# Patient Record
Sex: FEMALE | Race: WHITE
[De-identification: names, ages, dates, MRNs, and addresses within clinical notes are randomized per-mention and may not be internally consistent; named-entity substitution may affect disease eponyms.]

---

## 2017-04-18 ENCOUNTER — HOSPITAL ENCOUNTER (EMERGENCY)
Dept: HOSPITAL 75 - ER | Age: 22
Discharge: HOME | End: 2017-04-18
Payer: SELF-PAY

## 2017-04-18 VITALS — DIASTOLIC BLOOD PRESSURE: 93 MMHG | SYSTOLIC BLOOD PRESSURE: 135 MMHG

## 2017-04-18 VITALS — HEIGHT: 66 IN | WEIGHT: 135 LBS | BODY MASS INDEX: 21.69 KG/M2

## 2017-04-18 DIAGNOSIS — F19.21: ICD-10-CM

## 2017-04-18 DIAGNOSIS — F17.210: ICD-10-CM

## 2017-04-18 DIAGNOSIS — J45.901: Primary | ICD-10-CM

## 2017-04-18 PROCEDURE — 84703 CHORIONIC GONADOTROPIN ASSAY: CPT

## 2017-04-18 PROCEDURE — 87804 INFLUENZA ASSAY W/OPTIC: CPT

## 2017-04-18 PROCEDURE — 96372 THER/PROPH/DIAG INJ SC/IM: CPT

## 2017-04-18 PROCEDURE — 71020: CPT

## 2017-04-18 PROCEDURE — 99282 EMERGENCY DEPT VISIT SF MDM: CPT

## 2017-04-18 NOTE — DIAGNOSTIC IMAGING REPORT
Indication: Cough and wheezing



PA and lateral chest obtained at 932 hours a.m.



Heart and mediastinal silhouette are normal in appearance. The

lungs are clear. There is no pneumothorax or pleural fluid.



IMPRESSION:



Negative chest.



Dictated by:



Dictated on workstation # [id number]

## 2017-04-18 NOTE — ED RESPIRATORY
General


Chief Complaint:  Respiratory Problems


Stated Complaint:  COUGH/SOA


Nursing Triage Note:  


c/o difficulty breathing with cough. Significant wheezing auscultated. Pt 


reports having these episodes 2 times per week. Continues to smoke.


Source:  patient


Exam Limitations:  no limitations





History of Present Illness


Time seen by provider:  08:35


Initial Comments


This 21-year-old young lady presents to emergency room with difficulty breathing

, cough, and wheezing.  She is in mild respiratory distress on arrival.  She 

denies fever but is diaphoretic.  She has been seen and admitted for 

respiratory problems in the past.  She has never been formally diagnosed with 

asthma but has symptoms.  She never had her inhaler filled after being seen the 

last time because of cost.  She also has a history of methamphetamine use and 

smoking marijuana.  She denies use of illicit substances within the past few 

days.  She is presently seeking treatment at [facility] for substance abuse.  She 

continues to smoke despite her respiratory problems.  She has been using 

another family member's nebulizer machine at home but it broke last night.  She 

has not had a nebulizer treatment today.





Allergies and Home Medications


Allergies


Coded Allergies:  


     Penicillins (Unverified  Allergy, Mild, 12/7/10)


     ampicillin (Verified  Allergy, Mild, 13)


     levofloxacin (Verified  Allergy, Mild, RASH/ITCHING, 12/25/15)





Home Medications


Albuterol Sulfate 2.5 Mg/3 Ml Vial.neb, 2.5 MG IH Q4H PRN for WHEEZING, #30


   Prescribed by: PATSY MEDINA on 17 1006


Albuterol Sulfate 1 Puff Puff, 1-4 PUFF IH Q4H PRN for WHEEZING, #1


   1 PUFF = 90 MCG 


   Prescribed by: PATSY MEDINA on 17 1006


Budesonide 0.5 Mg/2 Ml Ampul.neb, 0.5 MG INH RTBID, #30


   Prescribed by: ABBIE DAMON on 12/26/15 1224


Cephalexin 500 Mg Capsule, 500 MG PO TID, #30


   Prescribed by: ABBIE DAMON on 12/26/15 1224


Ipratropium/Albuterol Sulfate 3 Ml Ampul.neb, 3 ML INH RTQ2H PRN for SOA, #30


   Prescribed by: ABBIE DAMON on 12/26/15 1224


L. Acidophilus/Bulgaricus 1 Each Tablet, 1 TAB.CHEW PO AC, #40


   Prescribed by: ABBIE DAMON on 12/26/15 1224


Prednisone 20 Mg Tab, 60 MG PO DAILY, #15


   Prescribed by: ABBIE DAMON on 12/26/15 1224


Prednisone 20 Mg Tab, 20 MG PO DAILY, #4


   Prescribed by: PATSY MEDINA on 17 1006





Constitutional:  no symptoms reported


EENTM:  no symptoms reported


Respiratory:  see HPI


Cardiovascular:  no symptoms reported


Gastrointestinal:  no symptoms reported


Genitourinary:  no symptoms reported


Musculoskeletal:  no symptoms reported


Skin:  no symptoms reported


Psychiatric/Neurological:  See HPI


Hematologic/Lymphatic:  No Symptoms Reported





Past Medical-Social-Family Hx


Patient Social History


Alcohol Use:  Denies Use


Recreational Drug Use:  No


Smoking Status:  Current Everyday Smoker


2nd Hand Smoke Exposure:  Yes


Recent Foreign Travel:  No


Contact w/Someone Who Travel:  No


Recent Infectious Disease Expo:  No


Recent Hopitalizations:  No





Immunizations Up To Date


Tetanus Booster (TDap):  Unknown


PED Vaccines UTD:  No





Seasonal Allergies


Seasonal Allergies:  No





Surgeries


HX Surgeries:  Yes (wisdom teeth)





Respiratory


Hx Respiratory Disorders:  Yes (tobaccoism, marijuana use, restrictive airway 

disease)


Respiratory Disorders:  Pneumonia





Cardiovascular


Hx Cardiac Disorders:  No





Neurological


Hx Neurological Disorders:  No





Reproductive System


Hx Reproductive Disorders:  No


Sexually Transmitted Disease:  No


HIV/AIDS:  No


Female Reproductive Disorders:  Denies





Genitourinary


Hx Genitourinary Disorders:  Yes


Genitourinary Disorders:  UTI-Chronic





Gastrointestinal


Hx Gastrointestinal Disorders:  No





Musculoskeletal


Hx Musculoskeletal Disorders:  No





Endocrine


Hx Endocrine Disorders:  No





HEENT


HX ENT Disorders:  No


Loss of Vision:  Denies


Hearing Impairment:  Denies





Cancer


Hx Cancer:  No





Psychosocial


Hx Psychiatric Problems:  Yes


Behavioral Health Disorders:  Anxiety





Integumentary


HX Skin/Integumentary Disorder:  No





Blood Transfusions


Hx Blood Disorders:  No


Adverse Reaction to a Blood Tr:  No





Family Medical History


Significant Family History:  Heart Disease, Hypertension


Family Medial History:  


Patient reports no known family medical history.





Physical Exam


Vital Signs





Vital Sign - Last 12Hours








 17





 08:30


 


Temp 97.9


 


Pulse 101


 


Resp 20


 


B/P (MAP) 138/108


 


Pulse Ox 90


 


O2 Delivery Room Air





Capillary Refill : Less Than 3 Seconds


General Appearance:  WD/WN, mild distress


HEENT:  PERRL/EOMI, normal ENT inspection


Neck:  normal inspection


Respiratory:  respiratory distress, accessory muscle use, wheezing


Cardiovascular:  regular rate, rhythm, no edema


Gastrointestinal:  non tender, soft


Extremities:  normal inspection, no pedal edema


Neurologic/Psychiatric:  CNs II-XII nml as tested, no motor/sensory deficits, 

alert, normal mood/affect, oriented x 3


Skin:  normal color, warm/dry





Progress/Results/Core Measures


Results/Orders


Micro Results





Microbiology


17 Influenza Types A,B Antigen (ANNY) - Final, Complete


          





My Orders





Orders - PATSY VELAZQUEZ MD


Albuterol/Ipra Inhalation Soln (Duoneb I (17 08:37)


Chest Pa/Lat (2 View) (17 08:41)


Albuterol/Ipra Inhalation Soln (Duoneb I (17 08:45)


Svn Sm Volume Nebulizer Rt-Rfs (17 08:41)


Urine Pregnancy Bedside (17 08:41)


Influenza A And B Antigens (17 08:41)


Methylprednisolone Sod Succ (Solu-Medrol (17 09:15)





Medications Given in ED





Current Medications








 Medications  Dose


 Ordered  Sig/Kate


 Route  Start Time


 Stop Time Status Last Admin


Dose Admin


 


 Albuterol/


 Ipratropium  3 ml  STK-MED ONCE


 .ROUTE  17 08:37


 17 08:41 DC 17 08:42


3 ML


 


 Methylprednisolone


 Sodium Succinate  125 mg  ONCE  ONCE


 IM  17 09:15


 17 09:16 DC 17 09:23


125 MG








Vital Signs/I&O





Vital Sign - Last 12Hours








 17





 08:30 09:23 10:12


 


Temp 97.9 97.9 97.5


 


Pulse 101  88


 


Resp 20  18


 


B/P (MAP) 138/108  


 


Pulse Ox 90  98


 


O2 Delivery Room Air  














Blood Pressure Mean:  118











Point of Care Testing


Urine Pregnancy-Bedside:  Negative


Progress Note :  


Progress Note


Patient received a DuoNeb treatment and a Solu-Medrol injection.  Wheezing 

resolved and she felt much better.  We discussed smoking cessation and 

substance abuse treatment at length.





Diagnostic Imaging





   Diagonstic Imaging:  Xray


   Plain Films/CT/US/NM/MRI:  chest


Comments


Chest x-ray viewed by me and report reviewed.  See report below:





NAME:   JEREMIAH ACEVEDO


[facility] REC#:   [id number]


ACCOUNT#:   [id number]


PT STATUS:   REG ER


:   1995


PHYSICIAN:   PATSY VELAZQUEZ MD


ADMIT DATE:   17/ER


   ***Draft***


Date of Exam:17





CHEST PA/LAT (2 VIEW)





Indication: Cough and wheezing





PA and lateral chest obtained at 932 hours a.m.





Heart and mediastinal silhouette are normal in appearance. The


lungs are clear. There is no pneumothorax or pleural fluid.





IMPRESSION:





Negative chest.





Dictated on workstation # [id number]





Dict:   17


Trans:   17


[facility] [id number]





Interpreted by:     KARIE PETERSON MD





Departure


Impression


Impression:  


 Primary Impression:  


 Asthma exacerbation


Disposition:  01 HOME, SELF-CARE


Condition:  Improved





Departure-Patient Inst.


Decision time for Depature:  10:03


Referrals:  


[facility] (PCP/Family)


Primary Care Physician


Patient Instructions:  Asthma, Adult (DC)





Add. Discharge Instructions:  


Quit smoking and use of any other inhaled substances as rapidly as possible.  

You may use nicotine replacement such as gum, lozenges, or patches.  Avoid over-

the-counter inhaled nicotine products.  Use your prednisone steroids as 

prescribed.  Use your inhaler and nebulizer machine as prescribed.  Make a 

follow-up appointment with [facility] as soon as possible.  Return to the ER if 

symptoms worsen.











All discharge instructions reviewed with patient and/or family. Voiced 

understanding.


Scripts


Prednisone (Prednisone) 20 Mg Tab


20 MG PO DAILY, #4 TAB


   Prov: PATSY VELAZQUEZ MD         17 


Albuterol Sulfate (PROAIR HFA) 1 Puff Puff


1-4 PUFF IH Q4H Y for WHEEZING, #1 PUFF


   1 PUFF = 90 MCG


   Prov: PATSY VELAZQUEZ MD         17 


Albuterol Sulfate (Albuterol Sulfate) 2.5 Mg/3 Ml Vial.neb


2.5 MG IH Q4H Y for WHEEZING, #30 EA


   Prov: PATSY VELAZQUEZ MD         17





Copy


Copies To 1:   AYO CAMPOVERDE JOSHUA T MD 2017 10:07

## 2020-02-25 ENCOUNTER — HOSPITAL ENCOUNTER (EMERGENCY)
Dept: HOSPITAL 75 - ER | Age: 25
Discharge: HOME | End: 2020-02-25
Payer: SELF-PAY

## 2020-02-25 VITALS — WEIGHT: 150.8 LBS | HEIGHT: 66.97 IN | BODY MASS INDEX: 23.67 KG/M2

## 2020-02-25 VITALS — SYSTOLIC BLOOD PRESSURE: 119 MMHG | DIASTOLIC BLOOD PRESSURE: 74 MMHG

## 2020-02-25 DIAGNOSIS — Z87.440: ICD-10-CM

## 2020-02-25 DIAGNOSIS — Z79.52: ICD-10-CM

## 2020-02-25 DIAGNOSIS — Z87.01: ICD-10-CM

## 2020-02-25 DIAGNOSIS — R07.81: Primary | ICD-10-CM

## 2020-02-25 DIAGNOSIS — Z88.8: ICD-10-CM

## 2020-02-25 DIAGNOSIS — Z88.1: ICD-10-CM

## 2020-02-25 DIAGNOSIS — Z88.0: ICD-10-CM

## 2020-02-25 PROCEDURE — 93005 ELECTROCARDIOGRAM TRACING: CPT

## 2020-02-25 PROCEDURE — 71046 X-RAY EXAM CHEST 2 VIEWS: CPT

## 2020-02-25 NOTE — ED COUGH/URI
General


Stated Complaint:  CHEST PAINS


Source:  patient


Exam Limitations:  no limitations





History of Present Illness


Date Seen by Provider:  Feb 25, 2020


Time Seen by Provider:  22:02


Initial Comments


To ER to three-day history of sharp right-sided chest pain from the back around 

the mid scapula around to the front. Pain is worsened by movement twisting and 

turning breathing coughing. No recent illness. Similar episode a few years ago, 

states she was advised to follow-up with Dr. Del Castillo but never did. She denies 

any drug or stimulant use.


Timing/Duration:  constant


Severity/Quality:  moderate


Associated Symptoms:  cough





Allergies and Home Medications


Allergies


Coded Allergies:  


     Penicillins (Unverified  Allergy, Mild, 12/7/10)


     ampicillin (Verified  Allergy, Mild, 5/27/13)


     levofloxacin (Verified  Allergy, Mild, RASH/ITCHING, 12/25/15)





Home Medications


Albuterol Sulfate 2.5 Mg/3 Ml Vial.neb, 2.5 MG IH Q4H PRN for WHEEZING


   Prescribed by: PATSY MEDINA on 4/18/17 1006


Albuterol Sulfate 1 Puff Puff, 1-4 PUFF IH Q4H PRN for WHEEZING


   1 PUFF = 90 MCG 


   Prescribed by: PATSY MEDINA on 4/18/17 1006


Budesonide 0.5 Mg/2 Ml Ampul.neb, 0.5 MG INH RTBID


   Prescribed by: ABBIE DAMON on 12/26/15 1224


Cephalexin 500 Mg Capsule, 500 MG PO TID


   Prescribed by: ABBIE DAMON on 12/26/15 1224


Ipratropium/Albuterol Sulfate 3 Ml Ampul.neb, 3 ML INH RTQ2H PRN for SOA


   Prescribed by: ABBIE DAMON on 12/26/15 1224


L. Acidophilus/Bulgaricus 1 Each Tablet, 1 TAB.CHEW PO AC


   Prescribed by: ABBIE DAMON on 12/26/15 1224


Prednisone 20 Mg Tab, 60 MG PO DAILY


   Prescribed by: ABBIE DAMON on 12/26/15 1224


Prednisone 20 Mg Tab, 20 MG PO DAILY


   Prescribed by: PATSY MEDINA on 4/18/17 1006





Patient Home Medication List


Home Medication List Reviewed:  Yes





Review of Systems


Review of Systems


Constitutional:  see HPI


EENTM:  see HPI


Respiratory:  no symptoms reported


Cardiovascular:  no symptoms reported


Genitourinary:  no symptoms reported


Musculoskeletal:  no symptoms reported


Skin:  no symptoms reported


Psychiatric/Neurological:  No Symptoms Reported


Hematologic/Lymphatic:  No Symptoms Reported


Immunological/Allergic:  no symptoms reported





Past Medical-Social-Family Hx


Patient Social History


2nd Hand Smoke Exposure:  Yes


Recent Foreign Travel:  No


Contact w/Someone Who Travel:  No


Recent Hopitalizations:  No





Immunizations Up To Date


Tetanus Booster (TDap):  Unknown


PED Vaccines UTD:  No





Seasonal Allergies


Seasonal Allergies:  No





Past Medical History


Pneumonia


Reproductive Disorders:  No


Female Reproductive Disorders:  Denies


Sexually Transmitted Disease:  No


HIV/AIDS:  No


UTI-Chronic


Loss of Vision:  Denies


Hearing Impairment:  Denies


Anxiety


Adverse Reaction/Blood Tranf:  No





Family Medical History





Patient reports no known family medical history.


Heart Disease, Hypertension





Physical Exam


Capillary Refill :


Height: 5'6.00"


Weight: 135lbs. oz. 61.979612ew;  BMI


Method:Estimated


General Appearance:  WD/WN, no apparent distress, other (no distress alert and 

oriented heart rate  sinus, oxygen 100% room air.)


HEENT:  PERRL/EOMI, normal ENT inspection


Respiratory:  no respiratory distress, no accessory muscle use


Cardiovascular:  regular rate, rhythm


Gastrointestinal:  normal bowel sounds, non tender, soft


Extremities:  normal range of motion, non-tender


Neurologic/Psychiatric:  alert, normal mood/affect, oriented x 3


Skin:  normal color, warm/dry





Progress/Results/Core Measures


Suspected Sepsis


SIRS


Temperature: 


Pulse:  


Respiratory Rate: 


 


Blood Pressure  / 


Mean:





Results/Orders


My Orders





Orders - NATALIO MYLES


Chest Pa/Lat (2 View) (2/25/20 22:00)


Ekg Tracing (2/25/20 22:00)


Ibuprofen  Tablet (Motrin  Tablet) (2/25/20 22:00)


Cyclobenzaprine Tablet (Flexeril Tablet) (2/25/20 22:00)





Vital Signs/I&O


Capillary Refill :





Departure


Impression





   Primary Impression:  


   Pleuritic chest pain


Disposition:  01 HOME, SELF-CARE


Condition:  Stable





Departure-Patient Inst.


Decision time for Depature:  22:19


Referrals:  


SHYANNE DEL CASTILLO DO





[facility] (PCP/Family)


Primary Care Physician


Patient Instructions:  Pleuritic Chest Pain





Add. Discharge Instructions:  


1. Return to ER for any concerns


2. Follow-up with your doctor next week


3.











NATALIO MYLES             Feb 25, 2020 22:03

## 2020-02-26 NOTE — DIAGNOSTIC IMAGING REPORT
Indication: Chest pain



PA and lateral chest



Heart size and pulmonary vascularity are normal. Lungs are clear.

There are no effusions or pneumothoraces.



IMPRESSION: Negative chest



Dictated by: 



  Dictated on workstation # [id number]

## 2020-10-14 ENCOUNTER — HOSPITAL ENCOUNTER (EMERGENCY)
Dept: HOSPITAL 75 - ER | Age: 25
Discharge: HOME | End: 2020-10-14
Payer: SELF-PAY

## 2020-10-14 VITALS — HEIGHT: 66.93 IN | BODY MASS INDEX: 21.8 KG/M2 | WEIGHT: 138.89 LBS

## 2020-10-14 VITALS — DIASTOLIC BLOOD PRESSURE: 63 MMHG | SYSTOLIC BLOOD PRESSURE: 104 MMHG

## 2020-10-14 DIAGNOSIS — Z79.52: ICD-10-CM

## 2020-10-14 DIAGNOSIS — Z88.1: ICD-10-CM

## 2020-10-14 DIAGNOSIS — Z82.49: ICD-10-CM

## 2020-10-14 DIAGNOSIS — F17.210: ICD-10-CM

## 2020-10-14 DIAGNOSIS — K52.9: Primary | ICD-10-CM

## 2020-10-14 DIAGNOSIS — Z88.0: ICD-10-CM

## 2020-10-14 LAB
ALBUMIN SERPL-MCNC: 4 GM/DL (ref 3.2–4.5)
ALP SERPL-CCNC: 58 U/L (ref 40–136)
ALT SERPL-CCNC: 12 U/L (ref 0–55)
APTT PPP: YELLOW S
BACTERIA #/AREA URNS HPF: (no result) /HPF
BASOPHILS # BLD AUTO: 0 10^3/UL (ref 0–0.1)
BASOPHILS NFR BLD AUTO: 0 % (ref 0–10)
BILIRUB SERPL-MCNC: 0.7 MG/DL (ref 0.1–1)
BILIRUB UR QL STRIP: NEGATIVE
BUN/CREAT SERPL: 17
CALCIUM SERPL-MCNC: 8.8 MG/DL (ref 8.5–10.1)
CHLORIDE SERPL-SCNC: 104 MMOL/L (ref 98–107)
CO2 SERPL-SCNC: 21 MMOL/L (ref 21–32)
CREAT SERPL-MCNC: 0.83 MG/DL (ref 0.6–1.3)
EOSINOPHIL # BLD AUTO: 0 10^3/UL (ref 0–0.3)
EOSINOPHIL NFR BLD AUTO: 0 % (ref 0–10)
EOSINOPHIL NFR BLD MANUAL: 1 %
FIBRINOGEN PPP-MCNC: CLEAR MG/DL
GFR SERPLBLD BASED ON 1.73 SQ M-ARVRAT: > 60 ML/MIN
GLUCOSE SERPL-MCNC: 135 MG/DL (ref 70–105)
GLUCOSE UR STRIP-MCNC: NEGATIVE MG/DL
HCT VFR BLD CALC: 39 % (ref 35–52)
HGB BLD-MCNC: 13.1 G/DL (ref 11.5–16)
KETONES UR QL STRIP: NEGATIVE
LEUKOCYTE ESTERASE UR QL STRIP: NEGATIVE
LIPASE SERPL-CCNC: 16 U/L (ref 8–78)
LYMPHOCYTES # BLD AUTO: 0.9 10^3/UL (ref 1–4)
LYMPHOCYTES NFR BLD AUTO: 6 % (ref 12–44)
MANUAL DIFFERENTIAL PERFORMED BLD QL: YES
MCH RBC QN AUTO: 33 PG (ref 25–34)
MCHC RBC AUTO-ENTMCNC: 33 G/DL (ref 32–36)
MCV RBC AUTO: 100 FL (ref 80–99)
MONOCYTES # BLD AUTO: 0.4 10^3/UL (ref 0–1)
MONOCYTES NFR BLD AUTO: 3 % (ref 0–12)
MONOCYTES NFR BLD: 2 %
NEUTROPHILS # BLD AUTO: 13.4 10^3/UL (ref 1.8–7.8)
NEUTROPHILS NFR BLD AUTO: 90 % (ref 42–75)
NEUTS BAND NFR BLD MANUAL: 75 %
NEUTS BAND NFR BLD: 17 %
NITRITE UR QL STRIP: NEGATIVE
PH UR STRIP: 7 [PH] (ref 5–9)
PLATELET # BLD: 239 10^3/UL (ref 130–400)
PMV BLD AUTO: 10.5 FL (ref 9–12.2)
POTASSIUM SERPL-SCNC: 4.2 MMOL/L (ref 3.6–5)
PROT SERPL-MCNC: 7.2 GM/DL (ref 6.4–8.2)
PROT UR QL STRIP: (no result)
RBC #/AREA URNS HPF: (no result) /HPF
RBC MORPH BLD: NORMAL
SODIUM SERPL-SCNC: 136 MMOL/L (ref 135–145)
SP GR UR STRIP: <=1.005 (ref 1.02–1.02)
VARIANT LYMPHS NFR BLD MANUAL: 5 %
WBC # BLD AUTO: 14.9 10^3/UL (ref 4.3–11)
WBC #/AREA URNS HPF: (no result) /HPF

## 2020-10-14 PROCEDURE — 85027 COMPLETE CBC AUTOMATED: CPT

## 2020-10-14 PROCEDURE — 84703 CHORIONIC GONADOTROPIN ASSAY: CPT

## 2020-10-14 PROCEDURE — 85007 BL SMEAR W/DIFF WBC COUNT: CPT

## 2020-10-14 PROCEDURE — 80053 COMPREHEN METABOLIC PANEL: CPT

## 2020-10-14 PROCEDURE — 74177 CT ABD & PELVIS W/CONTRAST: CPT

## 2020-10-14 PROCEDURE — 86141 C-REACTIVE PROTEIN HS: CPT

## 2020-10-14 PROCEDURE — 83690 ASSAY OF LIPASE: CPT

## 2020-10-14 PROCEDURE — 81000 URINALYSIS NONAUTO W/SCOPE: CPT

## 2020-10-14 PROCEDURE — 36415 COLL VENOUS BLD VENIPUNCTURE: CPT

## 2020-10-14 NOTE — DIAGNOSTIC IMAGING REPORT
PROCEDURE: CT abdomen and pelvis with contrast, rule out

appendicitis.



TECHNIQUE: Multiple contiguous axial images were obtained through

the abdomen and pelvis after the administration of intravenous

contrast. All CT scans use one or more of the following dose

optimizing techniques: automated exposure control, MA and/or KvP

adjustment based on patient size and exam type or iterative

reconstruction. 



INDICATION: Right lower quadrant abdominal pain. 



COMPARISON: None.



FINDINGS: Lung bases are clear. The liver, gallbladder, pancreas,

spleen, and adrenals are negative. No hydronephrosis. The

unopacified bladder and reproductive structures are grossly

unremarkable. The appendix is not identified. Prominent but

nondilated diffusely fluid-filled small bowel. No free

intraperitoneal air or fluid. No lymphadenopathy. Osseous

structures are negative.



IMPRESSION: 

1. Diffusely prominent but nondilated fluid-filled small bowel

can be seen with an enteritis. No evidence of bowel obstruction.

2. The appendix is not identified and may be surgically absent.

No suspicious inflammatory findings in the region of the cecum.

If appendicitis is of clinical concern, a repeat exam after

delayed oral or rectal contrast could be performed.



Dictated by: 



  Dictated on workstation # [id number]

## 2020-10-14 NOTE — ED ABDOMINAL PAIN
General


Chief Complaint:  Abdominal/GI Problems


Stated Complaint:  ABDOMINAL PAIN


Nursing Triage Note:  


PT STATES SEVERE ABD PAIN THAT STARTED LAST NIGHT, ON HER PERIOD BUT HAS NOT 


FELT PAIN LIKE THIS BEFORE.


Sepsis Screen:  No Definite Risk


Source of Information:  Patient


Exam Limitations:  No Limitations





History of Present Illness


Date Seen by Provider:  Oct 14, 2020


Time Seen by Provider:  13:28


Initial Comments


Patient presents to ER by private conveyance from home with chief complaint 

since last night she started experiencing constant abdominal pain around her 

umbilicus. She has no history of abdominal surgeries or trauma. No fevers chills

nausea or vomiting. She took some ibuprofen last night with little relief of 

pain. It's getting worse today and now she rates it as a 10 out of 10. She says 

she started her period 4 days ago and this is nothing like her menstrual cramps.

She is routine with her periods monthly. She is not on any medications including

birth control. She does not have any significant medical history or follow with 

a doctor. She is not having any dysuria, dyspareunia, discharge, diarrhea. She 

had a bowel movement yesterday which was normal, formed. No blood in the stool. 

No history of colonoscopies. Her family has a history of irritable bowel 

syndrome.





Allergies and Home Medications


Allergies


Coded Allergies:  


     Penicillins (Unverified  Allergy, Mild, 12/7/10)


     ampicillin (Verified  Allergy, Mild, 13)


     levofloxacin (Verified  Allergy, Mild, RASH/ITCHING, 12/25/15)





Home Medications


Albuterol Sulfate 2.5 Mg/3 Ml Vial.neb, 2.5 MG IH Q4H PRN for WHEEZING


   Prescribed by: PATSY MEDINA on 17 1006


Albuterol Sulfate 1 Puff Puff, 1-4 PUFF IH Q4H PRN for WHEEZING


   1 PUFF = 90 MCG 


   Prescribed by: PATSY MEDINA on 17 1006


Budesonide 0.5 Mg/2 Ml Ampul.neb, 0.5 MG INH RTBID


   Prescribed by: ABBIE DAMON on 12/26/15 1224


Cephalexin 500 Mg Capsule, 500 MG PO TID


   Prescribed by: ABBIE DAMON on 12/26/15 1224


Hydrocodone/Acetaminophen 1 Each Tablet, 1 EACH PO Q6H PRN for PAIN-BREAKTHROUGH


   Prescribed by: LESLYE POWERS on 10/14/20 1559


Ipratropium/Albuterol Sulfate 3 Ml Ampul.neb, 3 ML INH RTQ2H PRN for SOA


   Prescribed by: ABBIE DAMON on 12/26/15 1224


L. Acidophilus/Bulgaricus 1 Each Tablet, 1 TAB.CHEW PO AC


   Prescribed by: ABBIE DAMON on 12/26/15 1224


Ondansetron 4 Mg Tab.rapdis, 4 MG PO Q6H PRN for NAUSEA/VOMITING


   Prescribed by: LESLYE POWERS on 10/14/20 1557


Prednisone 20 Mg Tab, 60 MG PO DAILY


   Prescribed by: ABBIE DAMON on 12/26/15 1224


Prednisone 20 Mg Tab, 20 MG PO DAILY


   Prescribed by: PATSY MEDINA on 17 1006





Patient Home Medication List


Home Medication List Reviewed:  Yes





Review of Systems


Review of Systems


Constitutional:  No chills, No diaphoresis, No fever


EENTM:  No Blurred Vision, No Double Vision


Respiratory:  Denies Cough, Denies Shortness of Air


Cardiovascular:  Denies Chest Pain, Denies Lightheadedness


Gastrointestinal:  See HPI; Denies Abdomen Distended; Abdominal Pain; Denies 

Constipated, Denies Diarrhea, Denies Nausea, Denies Vomiting


Genitourinary:  Denies Burning, Denies Discharge, Denies Drainage


Musculoskeletal:  No back pain, No joint pain





All Other Systems Reviewed


Negative Unless Noted:  Yes





Past Medical-Social-Family Hx


Patient Social History


Alcohol Use:  Denies Use


Recreational Drug Use:  Yes


Smoking Status:  Current Everyday Smoker


Type Used:  Cigarettes


2nd Hand Smoke Exposure:  Yes


Recent Foreign Travel:  No


Contact w/Someone Who Travel:  No


Recent Infectious Disease Expo:  No


Recent Hopitalizations:  No





Immunizations Up To Date


Tetanus Booster (TDap):  Unknown


PED Vaccines UTD:  No





Seasonal Allergies


Seasonal Allergies:  No





Past Medical History


Surgeries:  Yes (wisdom teeth)


Respiratory:  Yes (tobaccoism, marijuana use, restrictive airway disease)


Pneumonia


Cardiac:  No


Neurological:  No


Pregnant:  No


Last Menstrual Period:  Oct 14, 2020


Reproductive Disorders:  No


Female Reproductive Disorders:  Denies


Sexually Transmitted Disease:  No


HIV/AIDS:  No


Genitourinary:  Yes


UTI-Chronic


Gastrointestinal:  No


Musculoskeletal:  No


Endocrine:  No


Loss of Vision:  Denies


Hearing Impairment:  Denies


Cancer:  No


Psychosocial:  Yes


Anxiety


Integumentary:  No


Blood Disorders:  No


Adverse Reaction/Blood Tranf:  No





Family Medical History





Patient reports no known family medical history.


Heart Disease, Hypertension





Physical Exam


Vital Signs





Vital Signs - First Documented








 10/14/20





 13:31


 


Temp 37.6


 


Pulse 103


 


Resp 22


 


B/P (MAP) 123/80 (94)


 


Pulse Ox 100


 


O2 Delivery Room Air





Capillary Refill : Less Than 3 Seconds


Height/Weight/BMI


Height: 5'6.00"


Weight: 135lbs. oz. [id number]; 21.00 BMI


Method:Estimated


General Appearance:  WD/WN, moderate distress


HEENT:  PERRL/EOMI, pharynx normal


Neck:  full range of motion, supple, normal inspection


Respiratory:  lungs clear, normal breath sounds, no respiratory distress, no 

accessory muscle use


Cardiovascular:  normal peripheral pulses, regular rate, rhythm


Gastrointestinal:  normal bowel sounds (quiescent), soft, no organomegaly; No 

guarding; rebound, tenderness (all 4 quadrants but especially in her 

epigastric/periumbilical. Rovsing sign causes pain across her abdomen. Heeltap 

causes pain. Negative for psoas signs. Alvarez sign positive right upper 

quadrant. McBurney's point tenderness with rebound tenderness. )


Extremities:  normal range of motion, normal inspection, normal capillary refill


Neurologic/Psychiatric:  alert, normal mood/affect, oriented x 3


Skin:  normal color, warm/dry





Progress/Results/Core Measures


Results/Orders


Lab Results





Laboratory Tests








Test


 10/14/20


13:45 Range/Units


 


 


White Blood Count


 14.9 H


 4.3-11.0


10^3/uL


 


Red Blood Count


 3.95 


 3.80-5.11


10^6/uL


 


Hemoglobin 13.1  11.5-16.0  g/dL


 


Hematocrit 39  35-52  %


 


Mean Corpuscular Volume 100 H 80-99  fL


 


Mean Corpuscular Hemoglobin 33  25-34  pg


 


Mean Corpuscular Hemoglobin


Concent 33 


 32-36  g/dL





 


Red Cell Distribution Width 13.5  10.0-14.5  %


 


Platelet Count


 239 


 130-400


10^3/uL


 


Mean Platelet Volume 10.5  9.0-12.2  fL


 


Immature Granulocyte % (Auto) 1   %


 


Neutrophils (%) (Auto) 90 H 42-75  %


 


Lymphocytes (%) (Auto) 6 L 12-44  %


 


Monocytes (%) (Auto) 3  0-12  %


 


Eosinophils (%) (Auto) 0  0-10  %


 


Basophils (%) (Auto) 0  0-10  %


 


Neutrophils # (Auto)


 13.4 H


 1.8-7.8


10^3/uL


 


Lymphocytes # (Auto)


 0.9 L


 1.0-4.0


10^3/uL


 


Monocytes # (Auto)


 0.4 


 0.0-1.0


10^3/uL


 


Eosinophils # (Auto)


 0.0 


 0.0-0.3


10^3/uL


 


Basophils # (Auto)


 0.0 


 0.0-0.1


10^3/uL


 


Immature Granulocyte # (Auto)


 0.1 


 0.0-0.1


10^3/uL


 


Neutrophils % (Manual) 75   %


 


Lymphocytes % (Manual) 5   %


 


Monocytes % (Manual) 2   %


 


Eosinophils % (Manual) 1   %


 


Band Neutrophils 17   %


 


Blood Morphology Comment NORMAL   


 


Sodium Level 136  135-145  MMOL/L


 


Potassium Level 4.2  3.6-5.0  MMOL/L


 


Chloride Level 104    MMOL/L


 


Carbon Dioxide Level 21  21-32  MMOL/L


 


Anion Gap 11  5-14  MMOL/L


 


Blood Urea Nitrogen 14  7-18  MG/DL


 


Creatinine


 0.83 


 0.60-1.30


MG/DL


 


Estimat Glomerular Filtration


Rate > 60 


  





 


BUN/Creatinine Ratio 17   


 


Glucose Level 135 H   MG/DL


 


Calcium Level 8.8  8.5-10.1  MG/DL


 


Corrected Calcium 8.8  8.5-10.1  MG/DL


 


Total Bilirubin 0.7  0.1-1.0  MG/DL


 


Aspartate Amino Transf


(AST/SGOT) 16 


 5-34  U/L





 


Alanine Aminotransferase


(ALT/SGPT) 12 


 0-55  U/L





 


Alkaline Phosphatase 58    U/L


 


C-Reactive Protein High


Sensitivity 11.43 H


 0.00-0.50


MG/DL


 


Total Protein 7.2  6.4-8.2  GM/DL


 


Albumin 4.0  3.2-4.5  GM/DL


 


Lipase 16  8-78  U/L


 


Serum Pregnancy Test,


Qualitative NEGATIVE 


 NEGATIVE  











My Orders





Orders - LESLYE POWERS


Pantoprazole Injection (Protonix Injecti (10/14/20 13:45)


Cbc With Automated Diff (10/14/20 13:35)


Comprehensive Metabolic Panel (10/14/20 13:35)


Hs C Reactive Protein (10/14/20 13:35)


Lipase (10/14/20 13:35)


Ed Iv/Invasive Line Start (10/14/20 13:35)


Lactated Ringers (Lr 1000 Ml Iv Solution (10/14/20 13:35)


Ketorolac Injection (Toradol Injection) (10/14/20 13:45)


Hcg,Qualitative Serum (10/14/20 13:48)


Manual Differential (10/14/20 13:45)


Ct Abd/Pelv W (Appendicitis) (10/14/20 14:20)


Iohexol Injection (Omnipaque 350 Mg/Ml 1 (10/14/20 14:30)


Received Contrast (Hold Metformin- Contr (10/14/20 14:30)


Ns (Ivpb) (Sodium Chloride 0.9% Ivpb Bag (10/14/20 14:30)





Medications Given in ED





Current Medications








 Medications  Dose


 Ordered  Sig/Kate


 Route  Start Time


 Stop Time Status Last Admin


Dose Admin


 


 Iohexol  100 ml  ONCE  ONCE


 IV  10/14/20 14:30


 10/14/20 14:55 DC 10/14/20 14:34


83 ML


 


 Ketorolac


 Tromethamine  30 mg  ONCE  ONCE


 IVP  10/14/20 13:45


 10/14/20 13:46 DC 10/14/20 13:54


30 MG


 


 Lactated Ringer's  1,000 ml @ 


 0 mls/hr  Q0M ONCE


 IV  10/14/20 13:35


 10/14/20 13:41 DC 10/14/20 13:54


1,000 MLS/HR


 


 Pantoprazole  40 mg  ONCE  ONCE


 IV  10/14/20 13:45


 10/14/20 13:46 DC 10/14/20 13:54


40 MG


 


 Sodium Chloride  100 ml  ONCE  ONCE


 IV  10/14/20 14:30


 10/14/20 14:55 DC 10/14/20 14:34


80 ML








Vital Signs/I&O











 10/14/20 10/14/20





 13:31 13:54


 


Temp 37.6 37.6


 


Pulse 103 


 


Resp 22 


 


B/P (MAP) 123/80 (94) 


 


Pulse Ox 100 


 


O2 Delivery Room Air 














Blood Pressure Mean:                    94











Progress


Progress Note #1:  


   Time:  13:46


Progress Note


Patient seems to have some mesenteric, tender abdominal exam with an elevated 

heart rate in the 90s. Regular her some Toradol and pantoprazole for her 

discomfort start. Is not meeting septic criteria just yet. Concern for 

appendicitis, cholecystitis. We'll get some labs to include lipase to help us 

differentiate. Urine. She says she just urinated in the lobby so we will get a 

serum hCG.


Progress Note #2:  


   Time:  14:15


Progress Note


Patient's pain is significantly improved from a 10 down to a 7. She did ask for 

something else for pain and we offered fentanyl but she declined opiates for 

now. She appears much more comfortable. She still has a tender abdomen 

especially in the right lower quadrant and her umbilicus. After reviewing the 

labs are does seem to be some kind of inflammatory possibly infectious process 

going on and our suspicion for appendicitis is high. We are going to get a CAT 

scan of the abdomen and pelvis with IV contrast focusing on the appendix.


Progress Note #3:  


   Time:  15:52


Progress Note


The patient is much more comfortable able to move around and was even resting 

with her eyes closed when we entered the room. Rule out allow her to transfer 

home with presumed viral enteritis on no antibiotics and follow-up in the clinic

in the next 2-3 days with Dr. Rosa. Return precautions given.





Diagnostic Imaging





   Diagonstic Imaging:  CT


   Plain Films/CT/US/NM/MRI:  abdomen, pelvis


Comments


NAME:   JEREMIAH ACEVEDO


[facility] REC#:   [id number]


ACCOUNT#:   [id number]


PT STATUS:   REG ER


:   1995


PHYSICIAN:   LESLYE POWERS MD


ADMIT DATE:   10/14/20/ER


                                   ***Draft***


Date of Exam:10/14/20





CT ABD/PELV W (APPENDICITIS)








PROCEDURE: CT abdomen and pelvis with contrast, rule out


appendicitis.





TECHNIQUE: Multiple contiguous axial images were obtained through


the abdomen and pelvis after the administration of intravenous


contrast. All CT scans use one or more of the following dose


optimizing techniques: automated exposure control, MA and/or KvP


adjustment based on patient size and exam type or iterative


reconstruction. 





INDICATION: Right lower quadrant abdominal pain. 





COMPARISON: None.





FINDINGS: Lung bases are clear. The liver, gallbladder, pancreas,


spleen, and adrenals are negative. No hydronephrosis. The


unopacified bladder and reproductive structures are grossly


unremarkable. The appendix is not identified. Prominent but


nondilated diffusely fluid-filled small bowel. No free


intraperitoneal air or fluid. No lymphadenopathy. Osseous


structures are negative.





IMPRESSION: 


1. Diffusely prominent but nondilated fluid-filled small bowel


can be seen with an enteritis. No evidence of bowel obstruction.


2. The appendix is not identified and may be surgically absent.


No suspicious inflammatory findings in the region of the cecum.


If appendicitis is of clinical concern, a repeat exam after


delayed oral or rectal contrast could be performed.





  Dictated on workstation # [id number]








Dict:   10/14/20 1443


Trans:   10/14/20 1454


AS6 [id number]





Interpreted by:     ISRA DIXON MD


Electronically signed by:


   Reviewed:  Reviewed by Me





Departure


Impression





   Primary Impression:  


   Enteritis


Disposition:   HOME, SELF-CARE


Condition:  Improved





Departure-Patient Inst.


Decision time for Depature:  15:45


Referrals:  


[facility] (PCP/Family)


Primary Care Physician








RISHABH ROSA MD


Patient Instructions:  Viral Gastroenteritis, Adult (DC)





Add. Discharge Instructions:  


I suspect you have a gastroenteritis which should be self-limiting in about 3-5 

days.


Drink plenty of fluids.


Ondansetron one tablet every 6 hours as necessary under the tongue if you have 

nausea or vomiting.


Tylenol 650 mg every 6 hours as necessary for pain.


Ibuprofen 800 mg every 8 hours as necessary for pain.


Hydrocodone one tablet every 6 hours as necessary for breakthrough pain.


If you develop diarrhea then you may use loperamide 2 tablets followed by one 

every 4 hours afterwards if you're still having watery stools.


Follow-up with Dr. Rosa in the clinic if your symptoms persist more than 3 days 

for reevaluation.


Return to the ER if you're having worsening symptoms such as fever, intractable 

pain, intractable nausea.





All discharge instructions reviewed with patient and/or family. Voiced 

understanding.


Scripts


Ondansetron (Ondansetron Odt) 4 Mg Tab.rapdis


4 MG PO Q6H PRN for NAUSEA/VOMITING, #8 TAB 0 Refills


   Prov: LESLYE POWERS         10/14/20 


Hydrocodone/Acetaminophen (Hydrocodone-Acetamin 5-325 mg) 1 Each Tablet


1 EACH PO Q6H PRN for PAIN-BREAKTHROUGH, #12 TAB 0 Refills


   Prov: LESLYE POWERS         10/14/20


Work/School Note:  Work Release Form   Date Seen in the Emergency Department:  

Oct 14, 2020


   Return to Work:  Oct 15, 2020


   Restrictions:  No Restrictions





Copy


Copies To 1:   RISHABH ROSA MD, TITUS J                 Oct 14, 2020 13:48

## 2022-10-13 ENCOUNTER — HOSPITAL ENCOUNTER (EMERGENCY)
Dept: HOSPITAL 75 - ER | Age: 27
Discharge: HOME | End: 2022-10-13
Payer: SELF-PAY

## 2022-10-13 VITALS — SYSTOLIC BLOOD PRESSURE: 111 MMHG | DIASTOLIC BLOOD PRESSURE: 88 MMHG

## 2022-10-13 DIAGNOSIS — F17.210: ICD-10-CM

## 2022-10-13 DIAGNOSIS — F15.10: ICD-10-CM

## 2022-10-13 DIAGNOSIS — R07.1: ICD-10-CM

## 2022-10-13 DIAGNOSIS — J06.9: Primary | ICD-10-CM

## 2022-10-13 DIAGNOSIS — Z20.822: ICD-10-CM

## 2022-10-13 DIAGNOSIS — Z28.311: ICD-10-CM

## 2022-10-13 DIAGNOSIS — R09.1: ICD-10-CM

## 2022-10-13 LAB
ALBUMIN SERPL-MCNC: 4.1 GM/DL (ref 3.2–4.5)
ALP SERPL-CCNC: 64 U/L (ref 40–136)
ALT SERPL-CCNC: 14 U/L (ref 0–55)
APTT PPP: YELLOW S
BACTERIA #/AREA URNS HPF: (no result) /HPF
BARBITURATES UR QL: NEGATIVE
BASOPHILS # BLD AUTO: 0 10^3/UL (ref 0–0.1)
BASOPHILS NFR BLD AUTO: 1 % (ref 0–10)
BENZODIAZ UR QL SCN: NEGATIVE
BILIRUB SERPL-MCNC: 0.3 MG/DL (ref 0.1–1)
BILIRUB UR QL STRIP: NEGATIVE
BUN/CREAT SERPL: 12
CALCIUM SERPL-MCNC: 9.2 MG/DL (ref 8.5–10.1)
CHLORIDE SERPL-SCNC: 105 MMOL/L (ref 98–107)
CO2 SERPL-SCNC: 22 MMOL/L (ref 21–32)
COCAINE UR QL: NEGATIVE
CREAT SERPL-MCNC: 0.92 MG/DL (ref 0.6–1.3)
EOSINOPHIL # BLD AUTO: 0.3 10^3/UL (ref 0–0.3)
EOSINOPHIL NFR BLD AUTO: 4 % (ref 0–10)
FIBRINOGEN PPP-MCNC: CLEAR MG/DL
GFR SERPLBLD BASED ON 1.73 SQ M-ARVRAT: 87 ML/MIN
GLUCOSE SERPL-MCNC: 87 MG/DL (ref 70–105)
GLUCOSE UR STRIP-MCNC: NEGATIVE MG/DL
HCT VFR BLD CALC: 40 % (ref 35–52)
HGB BLD-MCNC: 13.4 G/DL (ref 11.5–16)
KETONES UR QL STRIP: NEGATIVE
LEUKOCYTE ESTERASE UR QL STRIP: NEGATIVE
LYMPHOCYTES # BLD AUTO: 1.4 10^3/UL (ref 1–4)
LYMPHOCYTES NFR BLD AUTO: 18 % (ref 12–44)
MANUAL DIFFERENTIAL PERFORMED BLD QL: NO
MCH RBC QN AUTO: 32 PG (ref 25–34)
MCHC RBC AUTO-ENTMCNC: 34 G/DL (ref 32–36)
MCV RBC AUTO: 96 FL (ref 80–99)
METHADONE UR QL SCN: NEGATIVE
MONOCYTES # BLD AUTO: 0.7 10^3/UL (ref 0–1)
MONOCYTES NFR BLD AUTO: 8 % (ref 0–12)
NEUTROPHILS # BLD AUTO: 5.6 10^3/UL (ref 1.8–7.8)
NEUTROPHILS NFR BLD AUTO: 70 % (ref 42–75)
NITRITE UR QL STRIP: NEGATIVE
OPIATES UR QL SCN: NEGATIVE
OXYCODONE UR QL: NEGATIVE
PH UR STRIP: 5 [PH] (ref 5–9)
PLATELET # BLD: 295 10^3/UL (ref 130–400)
PMV BLD AUTO: 10.5 FL (ref 9–12.2)
POTASSIUM SERPL-SCNC: 4.1 MMOL/L (ref 3.6–5)
PROPOXYPH UR QL: NEGATIVE
PROT SERPL-MCNC: 7.7 GM/DL (ref 6.4–8.2)
PROT UR QL STRIP: NEGATIVE
RBC #/AREA URNS HPF: (no result) /HPF
SODIUM SERPL-SCNC: 138 MMOL/L (ref 135–145)
SP GR UR STRIP: <=1.005 (ref 1.02–1.02)
SQUAMOUS #/AREA URNS HPF: (no result) /HPF
TRICYCLICS UR QL SCN: NEGATIVE
WBC # BLD AUTO: 8.1 10^3/UL (ref 4.3–11)
WBC #/AREA URNS HPF: (no result) /HPF

## 2022-10-13 PROCEDURE — 93005 ELECTROCARDIOGRAM TRACING: CPT

## 2022-10-13 PROCEDURE — 81000 URINALYSIS NONAUTO W/SCOPE: CPT

## 2022-10-13 PROCEDURE — 36415 COLL VENOUS BLD VENIPUNCTURE: CPT

## 2022-10-13 PROCEDURE — 80053 COMPREHEN METABOLIC PANEL: CPT

## 2022-10-13 PROCEDURE — 84703 CHORIONIC GONADOTROPIN ASSAY: CPT

## 2022-10-13 PROCEDURE — 80306 DRUG TEST PRSMV INSTRMNT: CPT

## 2022-10-13 PROCEDURE — 85025 COMPLETE CBC W/AUTO DIFF WBC: CPT

## 2022-10-13 PROCEDURE — 87636 SARSCOV2 & INF A&B AMP PRB: CPT

## 2022-10-13 PROCEDURE — 84484 ASSAY OF TROPONIN QUANT: CPT

## 2022-10-13 PROCEDURE — 71045 X-RAY EXAM CHEST 1 VIEW: CPT

## 2022-10-13 NOTE — ED RESPIRATORY
General


Chief Complaint:  Chest Pain


Stated Complaint:  CHEST PAIN,COUGH,CONGESTION


Nursing Triage Note:  


PT ARRIVAL TO ER WITH COMPLAINT OF CHEST PAIN X2 DAYS, COUGH X3 WEEKS. PAIN IS 


WORSE WITH COUGH, MOVEMENT, AND PALPATION. 


 (DERIC CASTILLO)





History of Present Illness


Date Seen by Provider:  Oct 13, 2022


Time Seen by Provider:  19:45


Initial Comments


27-year-old  female presents for left-sided chest and rib pain.  Her 

symptoms of been present for approximately 2 days.  She has had cough and 

congestion for approximately 3 weeks.  She has been seen at [facility] and started on 

an inhaler.  She does have a history of respiratory distress and asthma.  She 

reports continuing to smoke meth. Denies SOA.


Timing/Duration:  intermittent, other (3 weeks cough, 2 days chest pain)


Severity:  moderate


Associated Symptoms:  chest pain/soreness, cough; No dizziness, No fever/chills,

No muscle aches; nasal congestion, nasal drainage; No shortness of breath, No 

sinus infection, No sore throat, No wheezing (DERIC CASTILLO)





Allergies and Home Medications


Allergies


Coded Allergies:  


     Penicillins (Unverified  Allergy, Mild, 12/7/10)


     ampicillin (Verified  Allergy, Mild, 13)


     levofloxacin (Verified  Allergy, Mild, RASH/ITCHING, 12/25/15)





Patient Home Medication List


Home Medication List Reviewed:  Yes


 (DERIC CASTILLO)


Albuterol Sulfate (Albuterol Sulfate) 2.5 Mg/3 Ml Vial.neb, 2.5 MG IH Q4H PRN 

for WHEEZING


   Prescribed by: PATSY MEDINA on 17 1006


Albuterol Sulfate (Proair Hfa) 1 Puff Puff, 1-4 PUFF IH Q4H PRN for WHEEZING


   Prescribed by: PATSY MEDINA on 17 1006


Albuterol Sulfate (Ventolin Hfa) 1 Puff Puff, 2 PUFF INH Q4H


   Prescribed by: DERIC CASTILLO on 10/13/22 2127


Budesonide (Budesonide) 0.5 Mg/2 Ml Ampul.neb, 0.5 MG INH RTBID


   Prescribed by: ABBIE DAMON on 12/26/15 1224


Cephalexin (Keflex) 500 Mg Capsule, 500 MG PO TID


   Prescribed by: ABBIE DAMON on 12/26/15 1224


Hydrocodone/Acetaminophen (Hydrocodone-Acetamin 5-325 mg) 1 Each Tablet, 1 EACH 

PO Q6H PRN for PAIN-BREAKTHROUGH


   Prescribed by: LESLYE POWERS on 10/14/20 1559


Ipratropium/Albuterol Sulfate (Iprat-Albut 0.5-3(2.5) mg/3 ml) 3 Ml Ampul.neb, 3

ML INH RTQ2H PRN for SOA


   Prescribed by: ABBIE DAMON on 12/26/15 1224


L. Acidophilus/Bulgaricus (Floranex Tablet) 1 Each Tablet, 1 TAB.CHEW PO AC


   Prescribed by: ABBIE DAMON on 12/26/15 1224


Ondansetron (Ondansetron Odt) 4 Mg Tab.rapdis, 4 MG PO Q6H PRN for NAUSEA/VOMITI

NG


   Prescribed by: LESLYE POWERS on 10/14/20 1557


Prednisone (Prednisone) 20 Mg Tab, 60 MG PO DAILY


   Prescribed by: ABBIE DAMON on 12/26/15 1224


Prednisone (Prednisone) 20 Mg Tab, 20 MG PO DAILY


   Prescribed by: PATSY MEDINA on 17 1006





Review of Systems


Review of Systems


Constitutional:  no symptoms reported, see HPI


Respiratory:  see HPI, cough


Cardiovascular:  see HPI, chest pain


Pregnant:  No


 (DERIC CASTILLO)





All Other Systems Reviewed


Negative Unless Noted:  Yes


 (DERIC CASTILLO)





Past Medical-Social-Family Hx


Patient Social History


Tobacco Use?:  Yes


Tobacco type used:  Cigarettes


Smoking Status:  Current Everyday Smoker


Use of E-Cig and/or Vaping dev:  No


Substance use?:  Yes


Substance type:  Methamphetamine


Additional substance use comme:  FORMER USER CLEAN X1 YEAR


Alcohol Use?:  Yes


Alcohol type:  Beer, Hard Liquor, Wine


Alcohol Frequency:  Couple times a week


Pt feels they are or have been:  No


 (DERIC CASTILLO)





Immunizations Up To Date


Tetanus Booster (TDap):  Unknown


PED Vaccines UTD:  No


Influenza Vaccine Up-to-Date:  No; Not Current


First/Initial COVID19 Vaccinat:  


COVID19 Vaccine :  PFIZER


 (DERIC CASTILLO)





Seasonal Allergies


Seasonal Allergies:  No


 (DERIC CASTILLO)





Past Medical History


Surgeries:  Yes (wisdom teeth)


Respiratory:  Yes (tobaccoism, marijuana use, restrictive airway disease)


Pneumonia


Cardiac:  No


Neurological:  No


Reproductive Disorders:  No


Female Reproductive Disorders:  Denies


Sexually Transmitted Disease:  No


HIV/AIDS:  No


Genitourinary:  Yes


UTI-Chronic


Gastrointestinal:  No


Musculoskeletal:  No


Endocrine:  No


Loss of Vision:  Denies


Hearing Impairment:  Denies


Cancer:  No


Psychosocial:  Yes


Anxiety


Integumentary:  No


Blood Disorders:  No


Adverse Reaction/Blood Tranf:  No


 (DERIC CASTILLO NANCY)





Family Medical History


Reviewed Nursing Family Hx


 (ANNADERIC CRUZ)





Patient reports no known family medical history.


Heart Disease, Hypertension


 (ANNADERIC CRUZ)





Physical Exam





Vital Signs - First Documented








 10/13/22





 19:36


 


Temp 36.6


 


Pulse 101


 


Resp 18


 


B/P (MAP) 117/89 (98)


 


Pulse Ox 98


 


O2 Delivery Room Air








 (PATSY VELAZQUEZ MD)


Capillary Refill : Less Than 3 Seconds 


 (ANNADERIC CRUZ)


Height: 5'6.00"


Weight: 135lbs. oz. [phone]; 21.00 BMI


Method:Estimated


General Appearance:  WD/WN, no apparent distress


HEENT:  PERRL/EOMI, normal ENT inspection, TMs normal, pharynx normal


Neck:  non-tender, full range of motion, supple, normal inspection


Respiratory:  lungs clear, normal breath sounds, other (Left CVAT)


Cardiovascular:  normal peripheral pulses, regular rate, rhythm


Gastrointestinal:  normal bowel sounds, non tender, soft


Neurologic/Psychiatric:  no motor/sensory deficits, alert, normal mood/affect, 

oriented x 3


Skin:  normal color, warm/dry (DERIC CASTILLO NANCY)





Progress/Results/Core Measures


Suspected Sepsis


SIRS


Temperature: 


Pulse: 101 


Respiratory Rate: 18


 


Laboratory Tests


10/13/22 19:45: White Blood Count 8.1


Blood Pressure 117 /89 


Mean: 98


 


Laboratory Tests


10/13/22 19:45: 


Creatinine 0.92, Platelet Count 295, Total Bilirubin 0.3


 (DERIC CASTILLO NANCY)





Results/Orders


Lab Results





Laboratory Tests








Test


 10/13/22


19:45 10/13/22


20:30 Range/Units


 


 


White Blood Count


 8.1 


 


 4.3-11.0


10^3/uL


 


Red Blood Count


 4.16 


 


 3.80-5.11


10^6/uL


 


Hemoglobin 13.4   11.5-16.0  g/dL


 


Hematocrit 40   35-52  %


 


Mean Corpuscular Volume 96   80-99  fL


 


Mean Corpuscular Hemoglobin 32   25-34  pg


 


Mean Corpuscular Hemoglobin


Concent 34 


 


 32-36  g/dL





 


Red Cell Distribution Width 12.6   10.0-14.5  %


 


Platelet Count


 295 


 


 130-400


10^3/uL


 


Mean Platelet Volume 10.5   9.0-12.2  fL


 


Immature Granulocyte % (Auto) 0    %


 


Neutrophils (%) (Auto) 70   42-75  %


 


Lymphocytes (%) (Auto) 18   12-44  %


 


Monocytes (%) (Auto) 8   0-12  %


 


Eosinophils (%) (Auto) 4   0-10  %


 


Basophils (%) (Auto) 1   0-10  %


 


Neutrophils # (Auto)


 5.6 


 


 1.8-7.8


10^3/uL


 


Lymphocytes # (Auto)


 1.4 


 


 1.0-4.0


10^3/uL


 


Monocytes # (Auto)


 0.7 


 


 0.0-1.0


10^3/uL


 


Eosinophils # (Auto)


 0.3 


 


 0.0-0.3


10^3/uL


 


Basophils # (Auto)


 0.0 


 


 0.0-0.1


10^3/uL


 


Immature Granulocyte # (Auto)


 0.0 


 


 0.0-0.1


10^3/uL


 


Sodium Level 138   135-145  MMOL/L


 


Potassium Level 4.1   3.6-5.0  MMOL/L


 


Chloride Level 105     MMOL/L


 


Carbon Dioxide Level 22   21-32  MMOL/L


 


Anion Gap 11   5-14  MMOL/L


 


Blood Urea Nitrogen 11   7-18  MG/DL


 


Creatinine


 0.92 


 


 0.60-1.30


MG/DL


 


Estimat Glomerular Filtration


Rate 87 


 


  





 


BUN/Creatinine Ratio 12    


 


Glucose Level 87     MG/DL


 


Calcium Level 9.2   8.5-10.1  MG/DL


 


Corrected Calcium 9.1   8.5-10.1  MG/DL


 


Total Bilirubin 0.3   0.1-1.0  MG/DL


 


Aspartate Amino Transf


(AST/SGOT) 15 


 


 5-34  U/L





 


Alanine Aminotransferase


(ALT/SGPT) 14 


 


 0-55  U/L





 


Alkaline Phosphatase 64     U/L


 


Troponin I < 0.028   <0.028  NG/ML


 


Total Protein 7.7   6.4-8.2  GM/DL


 


Albumin 4.1   3.2-4.5  GM/DL


 


Influenza Type A (RT-PCR) Not Detected   Not Detecte  


 


Influenza Type B (RT-PCR) Not Detected   Not Detecte  


 


SARS-CoV-2 RNA (RT-PCR) Not Detected   Not Detecte  


 


Urine Color  YELLOW   


 


Urine Clarity  CLEAR   


 


Urine pH  5.0  5-9  


 


Urine Specific Gravity  <=1.005  1.016-1.022  


 


Urine Protein  NEGATIVE  NEGATIVE  


 


Urine Glucose (UA)  NEGATIVE  NEGATIVE  


 


Urine Ketones  NEGATIVE  NEGATIVE  


 


Urine Nitrite  NEGATIVE  NEGATIVE  


 


Urine Bilirubin  NEGATIVE  NEGATIVE  


 


Urine Urobilinogen  0.2  < = 1.0  MG/DL


 


Urine Leukocyte Esterase  NEGATIVE  NEGATIVE  


 


Urine RBC (Auto)  NEGATIVE  NEGATIVE  


 


Urine RBC  NONE   /HPF


 


Urine WBC  RARE   /HPF


 


Urine Squamous Epithelial


Cells 


 5-10 


  /HPF





 


Urine Crystals  NONE   /LPF


 


Urine Bacteria  TRACE   /HPF


 


Urine Casts  NONE   /LPF


 


Urine Mucus  NEGATIVE   /LPF


 


Urine Culture Indicated  NO   


 


Urine Opiates Screen  NEGATIVE  NEGATIVE  


 


Urine Oxycodone Screen  NEGATIVE  NEGATIVE  


 


Urine Methadone Screen  NEGATIVE  NEGATIVE  


 


Urine Propoxyphene Screen  NEGATIVE  NEGATIVE  


 


Urine Barbiturates Screen  NEGATIVE  NEGATIVE  


 


Ur Tricyclic Antidepressants


Screen 


 NEGATIVE 


 NEGATIVE  





 


Urine Phencyclidine Screen  NEGATIVE  NEGATIVE  


 


Urine Amphetamines Screen  POSITIVE H NEGATIVE  


 


Urine Methamphetamines Screen  POSITIVE H NEGATIVE  


 


Urine Benzodiazepines Screen  NEGATIVE  NEGATIVE  


 


Urine Cocaine Screen  NEGATIVE  NEGATIVE  


 


Urine Cannabinoids Screen  NEGATIVE  NEGATIVE  





 (PATSY VELAZQUEZ MD)


My Orders





Orders - PATSY VELAZQUEZ MD


Ekg Tracing (10/13/22 19:31)


Covid 19 Inhouse Test (10/13/22 19:43)


Influenza A And B By Pcr (10/13/22 19:43)


 (PATSY VELAZQUEZ MD)


Vital Signs/I&O











 10/13/22 10/13/22





 19:36 21:31


 


Temp 36.6 


 


Pulse 101 94


 


Resp 18 18


 


B/P (MAP) 117/89 (98) 111/88


 


Pulse Ox 98 99


 


O2 Delivery Room Air Room Air








 (PATSY VELAZQUEZ MD)


Vital Signs/I&O


Capillary Refill : Less Than 3 Seconds 


 (DERIC CASTILLO)








Blood Pressure Mean:                    98











ECG


Initial ECG Impression Date:  Oct 13, 2022


Initial ECG Impression Time:  19:47


Initial ECG Rate:  107


Initial ECG Rhythm:  S.Tach


Initial ECG Intervals:  Normal


Initial ECG Intervals


, QRS D 86, , QTc 381.


Axis P 60, RR 22, T 26.


Initial ECG Impression:  Normal


Initial ECG Comparisson:  Unchanged


 (DERIC CASTILLO)





Diagnostic Imaging





   Diagonstic Imaging:  Xray


   Plain Films/CT/US/NM/MRI:  chest


Comments


NAME:   JEREMIAH ACEVEDO


[facility] REC#:   [id number]


ACCOUNT#:   [id number]


PT STATUS:   REG ER


:   1995


PHYSICIAN:   DERIC CASTILLO


ADMIT DATE:   10/13/22/ER


                                  ***Signed***


Date of Exam:10/13/22





CHEST 1 VIEW, AP/PA ONLY








EXAMINATION: Chest 1 view.





HISTORY: Congestion. Left-sided pleurisy.





COMPARISON: 2013. 2020.





FINDINGS: The lung volumes are normal. No focal consolidation is


seen. No large pleural effusion or pneumothorax is seen. The


cardiomediastinal silhouette is normal in size and contour. No


acute osseous abnormality is seen.





IMPRESSION: No acute pleuroparenchymal process. 





Dictated by: 





  Dictated on workstation # [id number]








Dict:   10/13/22 2055


Trans:   10/13/22 2103


[facility] [id number]





Interpreted by:     CLAUDIA FIGUEROA DO


Electronically signed by: CLAUDIA FIGUEROA DO 10/13/22 2103


   Reviewed:  Reviewed by Me


 (DERIC CASTILLO)





Departure


Impression





   Primary Impression:  


   Pleurisy


   Additional Impressions:  


   Costochondral chest pain


   Viral URI


   Methamphetamine abuse


Disposition:  01 HOME, SELF-CARE


Condition:  Improved





Departure-Patient Inst.


Decision time for Depature:  21:18


 (DERIC CASTILLO)


Referrals:  


[facility] (PCP/Family)


Primary Care Physician


Patient Instructions:  Methamphetamine, Pleuritic Chest Pain (DC), Viral Upper 

Respiratory Infection, Adult (DC)





Add. Discharge Instructions:  


Stop using meth, consider rehab. "worse" meth in area resulting in young adults 

dying. 


Use your inhaler 2 puffs every 4 hours. 


Use Ibuprofen 600 mg every 8 hours. 


Warm, moist compressions to left ribs. 


Use Robitussin Cough and Cold, as prescribed. 


Follow up at [facility], if symptoms are not improving or worsen. 


Return to the Emergency Dept for new, urgent health care needs. 





All discharge instructions reviewed with patient and/or family. Voiced u

nderstanding.


Scripts


Albuterol Sulfate (VENTOLIN HFA) 1 Puff Puff


2 PUFF INH Q4H, #1 EA 0 Refills


   1 PUFF = 90 MCG


   Prov: DERIC CASTILLO         10/13/22








ATTENDING PHYSICIAN NOTE:


I was physically present as attending physician in the emergency department 

during the care of this patient, but I was not directly involved in the decision

making or delivery of care for this patient. 


 (PATSY VELAZQUEZ MD)











DERIC CASTILLO                  Oct 13, 2022 20:40


PATSY VELAZQUEZ MD        Oct 14, 2022 08:07

## 2022-10-13 NOTE — DIAGNOSTIC IMAGING REPORT
EXAMINATION: Chest 1 view.



HISTORY: Congestion. Left-sided pleurisy.



COMPARISON: 05/27/2013. 02/25/2020.



FINDINGS: The lung volumes are normal. No focal consolidation is

seen. No large pleural effusion or pneumothorax is seen. The

cardiomediastinal silhouette is normal in size and contour. No

acute osseous abnormality is seen.



IMPRESSION: No acute pleuroparenchymal process. 



Dictated by: 



  Dictated on workstation # [id number]